# Patient Record
Sex: FEMALE | Race: OTHER | HISPANIC OR LATINO | Employment: UNEMPLOYED | URBAN - METROPOLITAN AREA
[De-identification: names, ages, dates, MRNs, and addresses within clinical notes are randomized per-mention and may not be internally consistent; named-entity substitution may affect disease eponyms.]

---

## 2018-07-23 ENCOUNTER — HOSPITAL ENCOUNTER (EMERGENCY)
Facility: HOSPITAL | Age: 33
Discharge: HOME | End: 2018-07-24
Attending: EMERGENCY MEDICINE | Admitting: EMERGENCY MEDICINE
Payer: COMMERCIAL

## 2018-07-23 DIAGNOSIS — R68.83 CHILLS: ICD-10-CM

## 2018-07-23 DIAGNOSIS — R51.9 HEADACHE: Primary | ICD-10-CM

## 2018-07-24 ENCOUNTER — APPOINTMENT (EMERGENCY)
Dept: CT IMAGING | Facility: HOSPITAL | Age: 33
End: 2018-07-24
Payer: COMMERCIAL

## 2018-07-24 ENCOUNTER — APPOINTMENT (OUTPATIENT)
Dept: LAB | Facility: HOSPITAL | Age: 33
End: 2018-07-24
Attending: EMERGENCY MEDICINE
Payer: COMMERCIAL

## 2018-07-24 VITALS
DIASTOLIC BLOOD PRESSURE: 61 MMHG | BODY MASS INDEX: 24.42 KG/M2 | TEMPERATURE: 98.7 F | SYSTOLIC BLOOD PRESSURE: 113 MMHG | RESPIRATION RATE: 18 BRPM | HEART RATE: 92 BPM | HEIGHT: 62 IN | WEIGHT: 132.72 LBS | OXYGEN SATURATION: 100 %

## 2018-07-24 DIAGNOSIS — R51.9 HEADACHE: ICD-10-CM

## 2018-07-24 LAB
ALBUMIN SERPL BCP-MCNC: 3.9 G/DL (ref 3.5–5)
ALP SERPL-CCNC: 54 U/L (ref 46–116)
ALT SERPL W P-5'-P-CCNC: 23 U/L (ref 12–78)
ANION GAP SERPL CALCULATED.3IONS-SCNC: 7 MMOL/L (ref 4–13)
AST SERPL W P-5'-P-CCNC: 11 U/L (ref 5–45)
BASOPHILS # BLD AUTO: 0.03 THOUSANDS/ΜL (ref 0–0.1)
BASOPHILS NFR BLD AUTO: 0 % (ref 0–1)
BILIRUB SERPL-MCNC: 0.3 MG/DL (ref 0.2–1)
BILIRUB UR QL STRIP: NEGATIVE
BUN SERPL-MCNC: 10 MG/DL (ref 5–25)
CALCIUM SERPL-MCNC: 8.8 MG/DL (ref 8.3–10.1)
CHLORIDE SERPL-SCNC: 104 MMOL/L (ref 100–108)
CK SERPL-CCNC: 90 U/L (ref 26–192)
CLARITY UR: CLEAR
CO2 SERPL-SCNC: 28 MMOL/L (ref 21–32)
COLOR UR: YELLOW
CREAT SERPL-MCNC: 0.76 MG/DL (ref 0.6–1.3)
EOSINOPHIL # BLD AUTO: 0.05 THOUSAND/ΜL (ref 0–0.61)
EOSINOPHIL NFR BLD AUTO: 0 % (ref 0–6)
ERYTHROCYTE [DISTWIDTH] IN BLOOD BY AUTOMATED COUNT: 12.9 % (ref 11.6–15.1)
EXT PREG TEST URINE: NEGATIVE
GFR SERPL CREATININE-BSD FRML MDRD: 106 ML/MIN/1.73SQ M
GLUCOSE SERPL-MCNC: 109 MG/DL (ref 65–140)
GLUCOSE UR STRIP-MCNC: NEGATIVE MG/DL
HCT VFR BLD AUTO: 38 % (ref 34.8–46.1)
HGB BLD-MCNC: 12.5 G/DL (ref 11.5–15.4)
HGB UR QL STRIP.AUTO: NEGATIVE
IMM GRANULOCYTES # BLD AUTO: 0.04 THOUSAND/UL (ref 0–0.2)
IMM GRANULOCYTES NFR BLD AUTO: 0 % (ref 0–2)
KETONES UR STRIP-MCNC: NEGATIVE MG/DL
LEUKOCYTE ESTERASE UR QL STRIP: NEGATIVE
LYMPHOCYTES # BLD AUTO: 1.39 THOUSANDS/ΜL (ref 0.6–4.47)
LYMPHOCYTES NFR BLD AUTO: 10 % (ref 14–44)
MAGNESIUM SERPL-MCNC: 1.7 MG/DL (ref 1.6–2.6)
MCH RBC QN AUTO: 27.1 PG (ref 26.8–34.3)
MCHC RBC AUTO-ENTMCNC: 32.9 G/DL (ref 31.4–37.4)
MCV RBC AUTO: 82 FL (ref 82–98)
MONOCYTES # BLD AUTO: 0.64 THOUSAND/ΜL (ref 0.17–1.22)
MONOCYTES NFR BLD AUTO: 5 % (ref 4–12)
NEUTROPHILS # BLD AUTO: 11.19 THOUSANDS/ΜL (ref 1.85–7.62)
NEUTS SEG NFR BLD AUTO: 85 % (ref 43–75)
NITRITE UR QL STRIP: NEGATIVE
NRBC BLD AUTO-RTO: 0 /100 WBCS
PH UR STRIP.AUTO: 7 [PH] (ref 4.5–8)
PLATELET # BLD AUTO: 189 THOUSANDS/UL (ref 149–390)
PMV BLD AUTO: 11.6 FL (ref 8.9–12.7)
POTASSIUM SERPL-SCNC: 3.5 MMOL/L (ref 3.5–5.3)
PROT SERPL-MCNC: 7.3 G/DL (ref 6.4–8.2)
PROT UR STRIP-MCNC: NEGATIVE MG/DL
RBC # BLD AUTO: 4.61 MILLION/UL (ref 3.81–5.12)
SODIUM SERPL-SCNC: 139 MMOL/L (ref 136–145)
SP GR UR STRIP.AUTO: <=1.005 (ref 1–1.03)
UROBILINOGEN UR QL STRIP.AUTO: 0.2 E.U./DL
WBC # BLD AUTO: 13.34 THOUSAND/UL (ref 4.31–10.16)

## 2018-07-24 PROCEDURE — 70450 CT HEAD/BRAIN W/O DYE: CPT

## 2018-07-24 PROCEDURE — 99284 EMERGENCY DEPT VISIT MOD MDM: CPT

## 2018-07-24 PROCEDURE — 80053 COMPREHEN METABOLIC PANEL: CPT | Performed by: EMERGENCY MEDICINE

## 2018-07-24 PROCEDURE — 87505 NFCT AGENT DETECTION GI: CPT

## 2018-07-24 PROCEDURE — 81003 URINALYSIS AUTO W/O SCOPE: CPT | Performed by: EMERGENCY MEDICINE

## 2018-07-24 PROCEDURE — 36415 COLL VENOUS BLD VENIPUNCTURE: CPT | Performed by: EMERGENCY MEDICINE

## 2018-07-24 PROCEDURE — 81025 URINE PREGNANCY TEST: CPT | Performed by: EMERGENCY MEDICINE

## 2018-07-24 PROCEDURE — 93005 ELECTROCARDIOGRAM TRACING: CPT

## 2018-07-24 PROCEDURE — 85025 COMPLETE CBC W/AUTO DIFF WBC: CPT | Performed by: EMERGENCY MEDICINE

## 2018-07-24 PROCEDURE — 83735 ASSAY OF MAGNESIUM: CPT | Performed by: EMERGENCY MEDICINE

## 2018-07-24 PROCEDURE — 82550 ASSAY OF CK (CPK): CPT | Performed by: EMERGENCY MEDICINE

## 2018-07-24 NOTE — DISCHARGE INSTRUCTIONS
Acute Headache   WHAT YOU NEED TO KNOW:   An acute headache is pain or discomfort that starts suddenly and gets worse quickly  You may have an acute headache only when you feel stress or eat certain foods  Other acute headache pain can happen every day, and sometimes several times a day  DISCHARGE INSTRUCTIONS:   Return to the emergency department if:   · You have severe pain  · You have numbness or weakness on one side of your face or body  · You have a headache that occurs after a blow to the head, a fall, or other trauma  · You have a headache, are forgetful or confused, or have trouble speaking  · You have a headache, stiff neck, and a fever  Contact your healthcare provider if:   · You have a constant headache and are vomiting  · You have a headache each day that does not get better, even after treatment  · You have changes in your headaches, or new symptoms that occur when you have a headache  · You have questions or concerns about your condition or care  Medicines: You may need any of the following:  · Prescription pain medicine  may be given  The medicine your healthcare provider recommends will depend on the kind of headaches you have  You will need to take prescription headache medicines as directed to prevent a problem called rebound headache  These headaches happen with regular use of pain relievers for headache disorders  · NSAIDs , such as ibuprofen, help decrease swelling, pain, and fever  This medicine is available with or without a doctor's order  NSAIDs can cause stomach bleeding or kidney problems in certain people  If you take blood thinner medicine, always ask your healthcare provider if NSAIDs are safe for you  Always read the medicine label and follow directions  · Acetaminophen  decreases pain and fever  It is available without a doctor's order  Ask how much to take and how often to take it  Follow directions   Read the labels of all other medicines you are using to see if they also contain acetaminophen, or ask your doctor or pharmacist  Acetaminophen can cause liver damage if not taken correctly  Do not use more than 3 grams (3,000 milligrams) total of acetaminophen in one day  · Antidepressants  may be given for some kinds of headaches  · Take your medicine as directed  Contact your healthcare provider if you think your medicine is not helping or if you have side effects  Tell him or her if you are allergic to any medicine  Keep a list of the medicines, vitamins, and herbs you take  Include the amounts, and when and why you take them  Bring the list or the pill bottles to follow-up visits  Carry your medicine list with you in case of an emergency  Manage your symptoms:   · Apply heat or ice  on the headache area  Use a heat or ice pack  For an ice pack, you can also put crushed ice in a plastic bag  Cover the pack or bag with a towel before you apply it to your skin  Ice and heat both help decrease pain, and heat also helps decrease muscle spasms  Apply heat for 20 to 30 minutes every 2 hours  Apply ice for 15 to 20 minutes every hour  Apply heat or ice for as long and for as many days as directed  You may alternate heat and ice  · Relax your muscles  Lie down in a comfortable position and close your eyes  Relax your muscles slowly  Start at your toes and work your way up your body  · Keep a record of your headaches  Write down when your headaches start and stop  Include your symptoms and what you were doing when the headache began  Record what you ate or drank for 24 hours before the headache started  Describe the pain and where it hurts  Keep track of what you did to treat your headache and if it worked  Prevent an acute headache:   · Avoid anything that triggers an acute headache  Examples include exposure to chemicals, going to high altitude, or not getting enough sleep  Create a regular sleep routine   Go to sleep at the same time and wake up at the same time each day  Do not use electronic devices before bedtime  These may trigger a headache or prevent you from sleeping well  · Do not smoke  Nicotine and other chemicals in cigarettes and cigars can trigger an acute headache or make it worse  Ask your healthcare provider for information if you currently smoke and need help to quit  E-cigarettes or smokeless tobacco still contain nicotine  Talk to your healthcare provider before you use these products  · Limit alcohol as directed  Alcohol can trigger an acute headache or make it worse  If you have cluster headaches, do not drink alcohol during an episode  For other types of headaches, ask your healthcare provider if it is safe for you to drink alcohol  Ask how much is safe for you to drink, and how often  · Exercise as directed  Exercise can reduce tension and help with headache pain  Aim for 30 minutes of physical activity on most days of the week  Your healthcare provider can help you create an exercise plan  · Eat a variety of healthy foods  Healthy foods include fruits, vegetables, low-fat dairy products, lean meats, fish, whole grains, and cooked beans  Your healthcare provider or dietitian can help you create meals plans if you need to avoid foods that trigger headaches  Follow up with your healthcare provider as directed:  Bring your headache record with you when you see your healthcare provider  Write down your questions so you remember to ask them during your visits  © 2017 [facility] Information is for End User's use only and may not be sold, redistributed or otherwise used for commercial purposes  All illustrations and images included in CareNotes® are the copyrighted property of A D A M [employer] Inc  or [employer]  The above information is an  only  It is not intended as medical advice for individual conditions or treatments   Talk to your doctor, nurse or pharmacist before following any medical regimen to see if it is safe and effective for you

## 2018-07-24 NOTE — ED PROVIDER NOTES
History  Chief Complaint   Patient presents with    Headache     pt called EMS after she began to shake , stated that she started with a headache about 7pm with a 10 out of 10 pain, now the headache is gone and states that she is having tremors, pt is alert and oriented and currently under no distress     29 y o  female presents with 30 minutes of left sided headache without radiation  Described as severe throbbing headache that came on suddenly and resolves  Patient states nothing worsens the pain and nothing improves the pain  ROS: patient affirms palpitations and tremors at the time, alert and oriented through the event that was witnessed by family  These have resolved, patient has diarrhea since arrival but no additional symptoms  Patient notes occasional left sided neck pain with radiation to the arm, seen by orthopaedist and diagnosed with pinched nerve, no pain at present  Denies fever/chills, seizure, weight loss, confusion, focal weakness, diaphoresis, visual changes, photophobia, nausea/vomiting, neck stiffness, facial pain, speech difficulty, weakness, gait disturbance, tingling/numbness, vertigo, lightheadedness, jaw claudication, syncope  Patient notes a history of similar symptoms when she was noted to have rhabdomyolysis that resolved with IV fluids  She was doing an intense workout program then, none today  Patient denies any concerns for CO exposure, recent tick bites, cervical manipulation, or trauma  Patient denies any use of illicit drugs  Patient affirms a history of similar headaches, never followed with any physician  Patient denies any history of connective tissue disorders  Patient denies any history or family history of SAH  Patient denies any history of immunocompromised state  Objective:   PHYSICAL EXAM:   Constitutional : Non-toxic  Well developed, well-nourished with no acute distress   Eyes: PERRL, EOMI   Normal fundoscopic exam with no signs of papilledema or retinal hemorrhage  No nystagmus with gaze fixation  HENT: Atraumatic  Pharynx moist and non-erythematous  No tenderness or swelling over the temporal arteries  Neck: no carotid bruit, no tenderness to palpitation , no neck pain or neck stiffness, no meningeal signs  CV: Regular rate and rhythm, no murmur  Peripheral pulses intact   Respiratory: Lungs clear to auscultation bilaterally   Abdomen: Soft, non-tender, non-distended  Back: No vertebral tenderness   Skin: Normal color, warm and dry, no rashes  Extremities: Non-tender, no pedal edema  Neuro: Alert and answers questions appropriately  Normal tandem gait, including toe walking and heel walking  Normal Romberg exam  No pronator drift  Normal finger to nose  Normal fine motor function with rapid finger movements  Normal hand tap  Cranial nerves II through XII grossly intact  Visual fields grossly intact  Upper and lower motor strength 5/5 and symmetric  Normal light touch sensory exam  Normal DTRs  Medical Decision Making   82-year-old female presenting with multiple symptoms which have mainly resolved prior to arrival   Patient currently noting only diarrhea  No history or family history of SAH  Onset was sudden, could represent   [place] bleed though this is less likely  Considering onset, will obtain CT imaging of patient's head  Patient is presenting right at 6 hours, will have patient scanned now as discuss risks and benefits of  Additional evaluation  Will obtain laboratory evaluation to evaluate for electrolytes considering patient's reported tremors  Will obtain EKG and place patient on cardiac monitoring  No report of any seizure activity and patient was alert and oriented throughout the episode  This patient is able to  Give stool sample, will check enteric panel for potential pathogens considering onset of diarrhea  Patient states the symptoms are inconsistent with her prior episode where she had rhabdomyolysis    No exertional activities today  Will monitor and reassess  None       Past Medical History:   Diagnosis Date    Heart murmur        History reviewed  No pertinent surgical history  History reviewed  No pertinent family history  I have reviewed and agree with the history as documented  Social History   Substance Use Topics    Smoking status: Never Smoker    Smokeless tobacco: Never Used    Alcohol use No        Review of Systems   All other systems reviewed and are negative        Physical Exam  Physical Exam    Vital Signs  ED Triage Vitals   Temperature Pulse Respirations Blood Pressure SpO2   07/23/18 2323 07/23/18 2323 07/23/18 2323 07/23/18 2323 07/23/18 2323   98 7 °F (37 1 °C) 101 20 (!) 144/20 100 %      Temp Source Heart Rate Source Patient Position - Orthostatic VS BP Location FiO2 (%)   07/23/18 2323 07/23/18 2323 07/23/18 2323 07/23/18 2323 --   Oral Monitor Lying Right arm       Pain Score       07/24/18 0000       3           Vitals:    07/23/18 2323   BP: (!) 144/20   Pulse: 101   Patient Position - Orthostatic VS: Lying       Visual Acuity      ED Medications  Medications - No data to display    Diagnostic Studies  Results Reviewed     Procedure Component Value Units Date/Time    Comprehensive metabolic panel [[id number]] Collected:  07/24/18 0140    Lab Status:  Final result Specimen:  Blood from Arm, Right Updated:  07/24/18 0201     Sodium 139 mmol/L      Potassium 3 5 mmol/L      Chloride 104 mmol/L      CO2 28 mmol/L      Anion Gap 7 mmol/L      BUN 10 mg/dL      Creatinine 0 76 mg/dL      Glucose 109 mg/dL      Calcium 8 8 mg/dL      AST 11 U/L      ALT 23 U/L      Alkaline Phosphatase 54 U/L      Total Protein 7 3 g/dL      Albumin 3 9 g/dL      Total Bilirubin 0 30 mg/dL      eGFR 106 ml/min/1 73sq m     Narrative:         National Kidney Disease Education Program recommendations are as follows:  GFR calculation is accurate only with a steady state creatinine  Chronic Kidney disease less than 60 ml/min/1 73 sq  meters  Kidney failure less than 15 ml/min/1 73 sq  meters      Magnesium [[id number]]  (Normal) Collected:  07/24/18 0140    Lab Status:  Final result Specimen:  Blood from Arm, Right Updated:  07/24/18 0201     Magnesium 1 7 mg/dL     CK [[id number]]  (Normal) Collected:  07/24/18 0140    Lab Status:  Final result Specimen:  Blood from Arm, Right Updated:  07/24/18 0200     Total CK 90 U/L     CBC and differential [[id number]]  (Abnormal) Collected:  07/24/18 0140    Lab Status:  Final result Specimen:  Blood from Arm, Right Updated:  07/24/18 0145     WBC 13 34 (H) Thousand/uL      RBC 4 61 Million/uL      Hemoglobin 12 5 g/dL      Hematocrit 38 0 %      MCV 82 fL      MCH 27 1 pg      MCHC 32 9 g/dL      RDW 12 9 %      MPV 11 6 fL      Platelets 007 Thousands/uL      nRBC 0 /100 WBCs      Neutrophils Relative 85 (H) %      Immat GRANS % 0 %      Lymphocytes Relative 10 (L) %      Monocytes Relative 5 %      Eosinophils Relative 0 %      Basophils Relative 0 %      Neutrophils Absolute 11 19 (H) Thousands/µL      Immature Grans Absolute 0 04 Thousand/uL      Lymphocytes Absolute 1 39 Thousands/µL      Monocytes Absolute 0 64 Thousand/µL      Eosinophils Absolute 0 05 Thousand/µL      Basophils Absolute 0 03 Thousands/µL     POCT pregnancy, urine [[id number]]  (Normal) Resulted:  07/24/18 0127    Lab Status:  Final result Updated:  07/24/18 0127     EXT PREG TEST UR (Ref: Negative) Negative    UA w Reflex to Microscopic [[id number]] Collected:  07/24/18 0120    Lab Status:  Final result Specimen:  Urine from Urine, Clean Catch Updated:  07/24/18 0127     Color, UA Yellow     Clarity, UA Clear     Specific Gravity, UA <=1 005     pH, UA 7 0     Leukocytes, UA Negative     Nitrite, UA Negative     Protein, UA Negative mg/dl      Glucose, UA Negative mg/dl      Ketones, UA Negative mg/dl      Urobilinogen, UA 0 2 E U /dl      Bilirubin, UA Negative     Blood, UA Negative    Stool Enteric Bacterial Panel by PCR [[id number]]     Lab Status:  No result Specimen:  Stool                  CT head without contrast   Final Result by Clay Nguyen MD (07/24 0227)      No acute intracranial abnormality  Workstation performed: [id number]                    Procedures  Procedures       Phone Contacts  ED Phone Contact    ED Course  ED Course as of Jul 24 0315   Tue Jul 24, 2018   0304 Patient continues to feel asymptomatic other than noting the diarrhea since arrival   No neck pain, headache, normal exam with no meningeal signs  Patient is afebrile  Discussed possibility of set bleed and discussed risks and benefits of lumbar puncture, which patient declined after discussion  Discussed waiting for additional evaluation versus close outpatient follow-up and patient prefers close outpatient follow-up  Discussed return precautions extensively in the emergency room, will provide prescription for enteric panel and have follow with primary care physician  Patient return to emergency room with any change or worsening in symptoms  Discussed symptomatic management in detail  MDM  CritCare Time    Disposition  Final diagnoses:   Headache   Chills     Time reflects when diagnosis was documented in both MDM as applicable and the Disposition within this note     Time User Action Codes Description Comment    7/24/2018  3:04 AM Be Mahajan Add [R51] Headache     7/24/2018  3:04 AM Be Mahajan Add [R68 83] [address]       ED Disposition     ED Disposition Condition Comment    Discharge  Ashly Teran 169 discharge to home/self care      Condition at discharge: Stable        Follow-up Information     Follow up With Specialties Details Why Contact Info Additional Information    Primary care physician  Schedule an appointment as soon as possible for a visit in 2 days Follow-up and reassessment      [facility] Emergency Department Emergency Medicine Go to If symptoms worsen [address]  [address] ED, [address] [facility], [place], [place], [address]          Patient's Medications    No medications on file       Outpatient Discharge Orders  Stool Enteric Bacterial Panel by PCR   Standing Status: Future  Standing Exp   Date: 07/24/19         ED Provider  Electronically Signed by           Caryle Pais, MD  07/24/18 0940

## 2018-07-25 LAB
ATRIAL RATE: 91 BPM
CAMPYLOBACTER DNA SPEC NAA+PROBE: NORMAL
P AXIS: 76 DEGREES
PR INTERVAL: 142 MS
QRS AXIS: 75 DEGREES
QRSD INTERVAL: 86 MS
QT INTERVAL: 354 MS
QTC INTERVAL: 435 MS
SALMONELLA DNA SPEC QL NAA+PROBE: NORMAL
SHIGA TOXIN STX GENE SPEC NAA+PROBE: NORMAL
SHIGELLA DNA SPEC QL NAA+PROBE: NORMAL
T WAVE AXIS: 42 DEGREES
VENTRICULAR RATE: 91 BPM

## 2018-07-25 PROCEDURE — 93010 ELECTROCARDIOGRAM REPORT: CPT | Performed by: INTERNAL MEDICINE

## 2019-04-30 ENCOUNTER — HOSPITAL ENCOUNTER (EMERGENCY)
Dept: HOSPITAL 74 - FER | Age: 34
Discharge: HOME | End: 2019-04-30
Payer: COMMERCIAL

## 2019-04-30 VITALS — BODY MASS INDEX: 21.5 KG/M2

## 2019-04-30 VITALS — TEMPERATURE: 98.3 F | DIASTOLIC BLOOD PRESSURE: 82 MMHG | SYSTOLIC BLOOD PRESSURE: 140 MMHG | HEART RATE: 94 BPM

## 2019-04-30 DIAGNOSIS — R01.1: ICD-10-CM

## 2019-04-30 DIAGNOSIS — N93.9: Primary | ICD-10-CM

## 2019-04-30 NOTE — PDOC
Documentation entered by Ria Wild SCRIBE, acting as scribe for Bridger Harris MD.








Bridger Harris MD:  This documentation has been prepared by the 

Junito espraza Renju, SCRIBE, under my direction and personally reviewed by me in 

its entirety.  I confirm that the documentation accurately reflects all work, 

treatment, procedures, and medical decision making performed by me.  





History of Present Illness





- General


Chief Complaint: Vaginal Bleeding


Stated Complaint: VAGINAL BLEEDING


Time Seen by Provider: 19 21:03


History Source: Patient


Exam Limitations: No Limitations





- History of Present Illness


Initial Comments: 





19 21:35


HPI


The patient is a 33 year old female who presents to the emergency department 

for evaluation of vaginal bleeding. Patient reports a 3 day history of vaginal 

bleeding, noting a large amount of clots today prompting her to visit the ED 

for further evaluation. She denies previous history of clotting. Patient 

reports an episode of sharp substernal chest pain lasting for 1 minute which 

resolved by itself while urinating this evening. She endorses a history of this 

chest pain which she has been evaluated for. Patient states the end of her LMP 

was on 2019. She states she has scheduled an appointment with her OB 

Physician this Thursday.  





The patient denies shortness of breath, headache, and dizziness.


Denies fevers, chills, nausea, vomiting, diarrhea, and constipation.





PAST MEDICAL HISTORY:  Heart Murmur


PAST SURGICAL HISTORY:  no significant history


FAMILY HISTORY:  no pertinent history


SOCIAL HISTORY:  Pt lives with  family and is employed.


MEDICATIONS:  reviewed


ALLERGIES:  As per nursing notes





Review of Systems


General:  No fevers or chills, no weakness, no weight loss 


HEENT: No change in vision.  No sore throat,. No ear pain


Cardiovascular:  (+)chest pain. No shortness of breath


Respiratory:No cough, or wheezing. 


Gastrointestinal:  no nausea, vomiting, diarrhea or constipation,  No rectal 

bleeding


Genitourinary:  (+)vaginal bleeding. No dysuria, hematuria, or frequency


Musculoskeletal:  No joint or muscle pain or swelling


Neurologic: No headache, vertigo, dizziness or loss of consciousness


Psychiatric: nor depression 


Skin: No rashes or easy bruising


Endocrine: no increased thirst or abnormal weight change


Allergic: no skin or latex allergy


All other systems reviewed and normal





Physical Exam:


General: Well-nourished well-developed individual, no acute distress


HEENT: Throat: Normal, tonsils normal, no erythema or exudate


Neck: Supple, no meningeal signs, no lymphadenopathy


Eyes:Pupils equal reactive and round, extraocular motion intact


Chest: Nontender to palpation 


Cardiac: (+)2/6 diastolic murmur heard best at left sternal border. 


Respiratory: Lungs clear to auscultation bilateral


Abdomen: Soft, nondistended, normal bowel sounds, nontender to palpation 

diffusely


Extremities: Warm, dry, no cyanosis, clubbing, or edema


Skin: No rashes


Neuro: Alert and oriented x3, nonfocal exam, grossly intact, normal gait


Psych: Normal mood and affect








19 21:33


Assessment plan: This a 33-year-old female who comes in complaining of some 

vaginal spotting. Patient. Finished recently and she now is concerned that she 

spotting again.





Patient had a pregnancy test that was done and negative





Patient was reassured that this most likely is due to a change in her hormone 

levels that is not that uncommon for a woman in her 30s.





Patient has an appointment with her OB doctor for 2 days from now and will keep 

the appointment. Patient discharged home.











Past History





- Past Medical History


Allergies/Adverse Reactions: 


 Allergies











Allergy/AdvReac Type Severity Reaction Status Date / Time


 


Fish Containing Products Allergy Unknown  Verified 19 21:00


 


fish derived Allergy Unknown  Verified 19 21:00


 


shellfish derived Allergy Unknown  Verified 19 21:00


 


SEAFOOD Allergy  Hives Uncoded 19 21:00











Home Medications: 


Ambulatory Orders





Cholecalciferol (Vitamin D3) [Vitamin D3 -] 2,000 unit PO DAILY 19 








Asthma: No


Cancer: No


Cardiac Disorders: Yes (H/O HEART MURMUR)


COPD: No


Diabetes: No


HTN: No


Seizures: No


Thyroid Disease: No





- Reproductive History


 (#): 3


Para: 2





- Suicide/Smoking/Psychosocial Hx


Smoking Status: No


Smoking History: Never smoked


Have you smoked in the past 12 months: No


Number of Cigarettes Smoked Daily: 0


Information on smoking cessation initiated: No


Hx Alcohol Use: No


Drug/Substance Use Hx: No


Substance Use Type: None


Hx Substance Use Treatment: No





*Physical Exam





- Vital Signs


 Last Vital Signs











Temp Pulse Resp BP Pulse Ox


 


 98.3 F   94 H  18   140/82   100 


 


 19 21:00  19 21:00  19 21:00  19 21:00  19 21:00














*DC/Admit/Observation/Transfer


Diagnosis at time of Disposition: 


 Vaginal spotting








- Discharge Dispostion


Disposition: HOME


Condition at time of disposition: Stable


Decision to Admit order: No





- Referrals





- Patient Instructions


Additional Instructions: 


It is important that you keep your appointment with your OB doctor for 2 days 

from now.





 Return to the emergency department immediately with ANY new, persistent or 

worsening symptoms.





Continue any medications as previously prescribed by your physician.





You should follow up with your primary doctor as soon as possible regarding 

today's emergency department visit.


.


Please make sure your doctor reviews the results of your emergency evaluation.





Thank you for coming to the   Emergency Department today for your care. It was 

a pleasure to see you today. Please note that your evaluation is INCOMPLETE 

until you  follow-up with your doctor.  





- Post Discharge Activity